# Patient Record
Sex: FEMALE | Race: WHITE | NOT HISPANIC OR LATINO | URBAN - METROPOLITAN AREA
[De-identification: names, ages, dates, MRNs, and addresses within clinical notes are randomized per-mention and may not be internally consistent; named-entity substitution may affect disease eponyms.]

---

## 2017-11-10 ENCOUNTER — IMPORTED ENCOUNTER (OUTPATIENT)
Dept: URBAN - METROPOLITAN AREA CLINIC 50 | Facility: CLINIC | Age: 57
End: 2017-11-10

## 2017-11-13 ENCOUNTER — IMPORTED ENCOUNTER (OUTPATIENT)
Dept: URBAN - METROPOLITAN AREA CLINIC 50 | Facility: CLINIC | Age: 57
End: 2017-11-13

## 2018-02-07 ENCOUNTER — IMPORTED ENCOUNTER (OUTPATIENT)
Dept: URBAN - METROPOLITAN AREA CLINIC 50 | Facility: CLINIC | Age: 58
End: 2018-02-07

## 2018-11-12 ENCOUNTER — IMPORTED ENCOUNTER (OUTPATIENT)
Dept: URBAN - METROPOLITAN AREA CLINIC 50 | Facility: CLINIC | Age: 58
End: 2018-11-12

## 2019-01-14 ENCOUNTER — IMPORTED ENCOUNTER (OUTPATIENT)
Dept: URBAN - METROPOLITAN AREA CLINIC 50 | Facility: CLINIC | Age: 59
End: 2019-01-14

## 2019-11-11 ENCOUNTER — IMPORTED ENCOUNTER (OUTPATIENT)
Dept: URBAN - METROPOLITAN AREA CLINIC 50 | Facility: CLINIC | Age: 59
End: 2019-11-11

## 2020-12-02 ENCOUNTER — IMPORTED ENCOUNTER (OUTPATIENT)
Dept: URBAN - METROPOLITAN AREA CLINIC 50 | Facility: CLINIC | Age: 60
End: 2020-12-02

## 2021-04-17 ASSESSMENT — TONOMETRY
OD_IOP_MMHG: 18
OS_IOP_MMHG: 13
OS_IOP_MMHG: 12
OD_IOP_MMHG: 12
OS_IOP_MMHG: 18
OS_IOP_MMHG: 15
OD_IOP_MMHG: 15
OS_IOP_MMHG: 18
OD_IOP_MMHG: 18
OD_IOP_MMHG: 13

## 2021-04-17 ASSESSMENT — VISUAL ACUITY
OD_SC: 20/20
OD_CC: J1+@ 18 IN
OS_SC: 20/20-1
OD_PH: @ 18 IN
OD_SC: 20/20-1
OD_CC: J1
OD_SC: 20/15
OS_SC: 20/20
OD_SC: 20/20
OS_SC: 20/15-2
OS_SC: 20/20
OS_CC: J1+@ 18 IN
OD_CC: J1+
OD_CC: J1+
OS_CC: J1+
OD_SC: 20/20
OS_CC: J1
OS_CC: J1+
OS_CC: J1+@ 18 IN
OS_PH: @ 18 IN
OD_CC: J1+@ 18 IN
OS_SC: 20/20-1

## 2021-12-02 ENCOUNTER — PREPPED CHART (OUTPATIENT)
Dept: URBAN - METROPOLITAN AREA CLINIC 52 | Facility: CLINIC | Age: 61
End: 2021-12-02

## 2022-01-25 ENCOUNTER — COMPREHENSIVE EXAM (OUTPATIENT)
Dept: URBAN - METROPOLITAN AREA CLINIC 52 | Facility: CLINIC | Age: 62
End: 2022-01-25

## 2022-01-25 DIAGNOSIS — H43.811: ICD-10-CM

## 2022-01-25 DIAGNOSIS — H01.006: ICD-10-CM

## 2022-01-25 DIAGNOSIS — H01.003: ICD-10-CM

## 2022-01-25 DIAGNOSIS — H04.123: ICD-10-CM

## 2022-01-25 PROCEDURE — 92014 COMPRE OPH EXAM EST PT 1/>: CPT

## 2022-01-25 ASSESSMENT — KERATOMETRY
OS_K2POWER_DIOPTERS: 42.75
OS_AXISANGLE2_DEGREES: 12
OD_AXISANGLE2_DEGREES: 101
OD_K1POWER_DIOPTERS: 42.50
OS_AXISANGLE_DEGREES: 102
OD_K2POWER_DIOPTERS: 43.00
OS_K1POWER_DIOPTERS: 42.50
OD_AXISANGLE_DEGREES: 11

## 2022-01-25 ASSESSMENT — TONOMETRY
OD_IOP_MMHG: 14
OS_IOP_MMHG: 14

## 2022-01-25 ASSESSMENT — VISUAL ACUITY
OS_CC: J1+ @ 16IN
OU_CC: J1+ @ 16IN
OU_SC: 20/20
OS_SC: 20/20
OD_SC: 20/20
OD_CC: J1+ @ 16IN

## 2022-01-25 NOTE — PATIENT DISCUSSION
Advised patient to use preservative-free artificial tears QID OU, warm compresses BID OU, lid scrubs BID OU, and gel drops QHS OU.

## 2023-01-25 ENCOUNTER — COMPREHENSIVE EXAM (OUTPATIENT)
Dept: URBAN - METROPOLITAN AREA CLINIC 52 | Facility: CLINIC | Age: 63
End: 2023-01-25

## 2023-01-25 DIAGNOSIS — D31.31: ICD-10-CM

## 2023-01-25 PROCEDURE — 92014 COMPRE OPH EXAM EST PT 1/>: CPT

## 2023-01-25 RX ORDER — BEPOTASTINE BESILATE 15 MG/ML: 1 SOLUTION/ DROPS OPHTHALMIC AS NEEDED

## 2023-01-25 ASSESSMENT — VISUAL ACUITY
OS_SC: 20/20
OU_SC: 20/20
OD_SC: 20/20
OU_CC: J1+

## 2023-01-25 ASSESSMENT — KERATOMETRY
OD_K1POWER_DIOPTERS: 42.50
OS_K2POWER_DIOPTERS: 42.75
OS_K1POWER_DIOPTERS: 42.50
OS_AXISANGLE2_DEGREES: 12
OD_AXISANGLE2_DEGREES: 101
OD_K2POWER_DIOPTERS: 43.00
OS_AXISANGLE_DEGREES: 102
OD_AXISANGLE_DEGREES: 11

## 2023-01-25 ASSESSMENT — TONOMETRY
OS_IOP_MMHG: 13
OD_IOP_MMHG: 12

## 2023-01-25 NOTE — PATIENT DISCUSSION
Will provide a coupon for Deyanira Mitchell so she can switch the systane to help with her irritation.

## 2023-01-25 NOTE — PATIENT DISCUSSION
She would like to try prescription grade allergy eye drops. Will call in Bepreve and allow for Bepotastine Besilate 1% if not covered.

## 2023-09-19 NOTE — PATIENT DISCUSSION
"""Allergic conjunctivitis Adbry Counseling: I discussed with the patient the risks of tralokinumab including but not limited to eye infection and irritation, cold sores, injection site reactions, worsening of asthma, allergic reactions and increased risk of parasitic infection.  Live vaccines should be avoided while taking tralokinumab. The patient understands that monitoring is required and they must alert us or the primary physician if symptoms of infection or other concerning signs are noted.

## 2024-02-21 ENCOUNTER — COMPREHENSIVE EXAM (OUTPATIENT)
Dept: URBAN - METROPOLITAN AREA CLINIC 52 | Facility: CLINIC | Age: 64
End: 2024-02-21

## 2024-02-21 DIAGNOSIS — D31.31: ICD-10-CM

## 2024-02-21 PROCEDURE — 99214 OFFICE O/P EST MOD 30 MIN: CPT

## 2024-02-21 ASSESSMENT — TONOMETRY
OD_IOP_MMHG: 12
OS_IOP_MMHG: 12

## 2024-02-21 ASSESSMENT — KERATOMETRY
OS_K2POWER_DIOPTERS: 42.75
OD_K1POWER_DIOPTERS: 42.50
OS_AXISANGLE2_DEGREES: 12
OD_K2POWER_DIOPTERS: 43.00
OD_AXISANGLE_DEGREES: 11
OD_AXISANGLE2_DEGREES: 101
OS_AXISANGLE_DEGREES: 102
OS_K1POWER_DIOPTERS: 42.50

## 2024-02-21 ASSESSMENT — VISUAL ACUITY
OD_GLARE: 20/20
OD_GLARE: 20/20
OS_GLARE: 20/20
OD_SC: 20/20
OS_SC: 20/20
OS_GLARE: 20/20
OU_SC: 20/20

## 2025-03-07 ENCOUNTER — COMPREHENSIVE EXAM (OUTPATIENT)
Age: 65
End: 2025-03-07

## 2025-03-07 DIAGNOSIS — H43.811: ICD-10-CM

## 2025-03-07 DIAGNOSIS — H10.13: ICD-10-CM

## 2025-03-07 DIAGNOSIS — H40.023: ICD-10-CM

## 2025-03-07 DIAGNOSIS — G43.911: ICD-10-CM

## 2025-03-07 DIAGNOSIS — D31.32: ICD-10-CM

## 2025-03-07 DIAGNOSIS — D31.31: ICD-10-CM

## 2025-03-07 DIAGNOSIS — H04.123: ICD-10-CM

## 2025-03-07 DIAGNOSIS — H35.363: ICD-10-CM

## 2025-03-07 PROCEDURE — 99214 OFFICE O/P EST MOD 30 MIN: CPT

## 2025-03-07 PROCEDURE — 92134 CPTRZ OPH DX IMG PST SGM RTA: CPT

## 2025-04-16 ENCOUNTER — DIAGNOSTICS ONLY (OUTPATIENT)
Age: 65
End: 2025-04-16

## 2025-04-16 DIAGNOSIS — H40.023: ICD-10-CM

## 2025-04-16 PROCEDURE — 92133 CPTRZD OPH DX IMG PST SGM ON: CPT

## 2025-04-16 PROCEDURE — 92083 EXTENDED VISUAL FIELD XM: CPT
